# Patient Record
Sex: MALE | Race: WHITE | NOT HISPANIC OR LATINO | URBAN - METROPOLITAN AREA
[De-identification: names, ages, dates, MRNs, and addresses within clinical notes are randomized per-mention and may not be internally consistent; named-entity substitution may affect disease eponyms.]

---

## 2020-06-25 ENCOUNTER — APPOINTMENT (RX ONLY)
Dept: URBAN - METROPOLITAN AREA CLINIC 52 | Facility: CLINIC | Age: 80
Setting detail: DERMATOLOGY
End: 2020-06-25

## 2020-06-25 ENCOUNTER — APPOINTMENT (RX ONLY)
Dept: URBAN - METROPOLITAN AREA CLINIC 51 | Facility: CLINIC | Age: 80
Setting detail: DERMATOLOGY
End: 2020-06-25

## 2020-06-25 DIAGNOSIS — L29.8 OTHER PRURITUS: ICD-10-CM

## 2020-06-25 DIAGNOSIS — L29.89 OTHER PRURITUS: ICD-10-CM

## 2020-06-25 DIAGNOSIS — B02.9 ZOSTER WITHOUT COMPLICATIONS: ICD-10-CM

## 2020-06-25 PROCEDURE — 99202 OFFICE O/P NEW SF 15 MIN: CPT | Mod: 25

## 2020-06-25 PROCEDURE — ? INJECTION

## 2020-06-25 PROCEDURE — ? COUNSELING

## 2020-06-25 PROCEDURE — ? PRESCRIPTION

## 2020-06-25 PROCEDURE — 96372 THER/PROPH/DIAG INJ SC/IM: CPT

## 2020-06-25 RX ORDER — VALACYCLOVIR HYDROCHLORIDE 1 G/1
TABLET, FILM COATED ORAL TID
Qty: 21 | Refills: 0 | Status: ERX | COMMUNITY
Start: 2020-06-25

## 2020-06-25 RX ORDER — TRIAMCINOLONE ACETONIDE 1 MG/G
CREAM TOPICAL BID
Qty: 1 | Refills: 1 | Status: ERX | COMMUNITY
Start: 2020-06-25

## 2020-06-25 RX ORDER — PREDNISONE 5 MG/1
TABLET ORAL
Qty: 60 | Refills: 0 | Status: ERX | COMMUNITY
Start: 2020-06-25

## 2020-06-25 RX ADMIN — VALACYCLOVIR HYDROCHLORIDE: 1 TABLET, FILM COATED ORAL at 00:00

## 2020-06-25 RX ADMIN — TRIAMCINOLONE ACETONIDE: 1 CREAM TOPICAL at 00:00

## 2020-06-25 RX ADMIN — PREDNISONE: 5 TABLET ORAL at 00:00

## 2020-06-25 ASSESSMENT — PAIN INTENSITY VAS
HOW INTENSE IS YOUR PAIN 0 BEING NO PAIN, 10 BEING THE MOST SEVERE PAIN POSSIBLE?: 2/10 PAIN
HOW INTENSE IS YOUR PAIN 0 BEING NO PAIN, 10 BEING THE MOST SEVERE PAIN POSSIBLE?: 2/10 PAIN

## 2020-06-25 ASSESSMENT — LOCATION DETAILED DESCRIPTION DERM
LOCATION DETAILED: LEFT INFERIOR LATERAL UPPER BACK
LOCATION DETAILED: LEFT LATERAL INFERIOR CHEST
LOCATION DETAILED: LEFT LATERAL INFERIOR CHEST
LOCATION DETAILED: RIGHT ANTERIOR SHOULDER
LOCATION DETAILED: LEFT INFERIOR LATERAL UPPER BACK
LOCATION DETAILED: RIGHT ANTERIOR SHOULDER

## 2020-06-25 ASSESSMENT — LOCATION ZONE DERM
LOCATION ZONE: TRUNK
LOCATION ZONE: ARM
LOCATION ZONE: TRUNK
LOCATION ZONE: ARM

## 2020-06-25 ASSESSMENT — LOCATION SIMPLE DESCRIPTION DERM
LOCATION SIMPLE: LEFT UPPER BACK
LOCATION SIMPLE: CHEST
LOCATION SIMPLE: CHEST
LOCATION SIMPLE: RIGHT SHOULDER
LOCATION SIMPLE: LEFT UPPER BACK
LOCATION SIMPLE: RIGHT SHOULDER

## 2020-06-25 NOTE — PROCEDURE: INJECTION
Procedure Information: Please note that the numeric value listed in the Medication (1) and associated J-code units and Medication (2) and associated J-code units variables are j-code amounts and do not represent either the concentration or the total amount of the medications injected.  I strongly recommend selecting no to the Render J-code information in note question. This will allow your note to be more clear. If you are billing j-codes with your injection codes you need to document the total amount of the medication injected. This amount should match the j-code units. For example, if you are injecting Triamcinolone 40mg as an intramuscular injection you would select 40 for the dose field and mg for the units. This would allow you to document  with 4 units (40mg = 10mg x 4). The total volume is not used to calculate j-codes only the amount of the medication administered.
Dose Administered (Numbers Only - Mg, G, Mcg, Units, Cc): 0
Units: cc
Hide Second Medication?: No
Detail Level: None
Dose Administered (Numbers Only - Mg, G, Mcg, Units, Cc): 1
Consent: The risks of the medication was reviewed with the patient.
Render J-Code Information In Note?: yes
Route: IM
Medication (1) And Associated J-Code Units: Depomedrol, 40mg
Post-Care Instructions: I reviewed with the patient in detail post-care instructions. Patient understands to keep the injection sites clean and call the clinic if there is any redness, swelling or pain.

## 2020-06-25 NOTE — PROCEDURE: COUNSELING
Patient Specific Counseling (Will Not Stick From Patient To Patient): Recommended patient to follow up with cancer doctor. Had colon ca in the past.
Detail Level: Detailed
Detail Level: Generalized

## 2020-06-25 NOTE — PROCEDURE: INJECTION
Procedure Information: Please note that the numeric value listed in the Medication (1) and associated J-code units and Medication (2) and associated J-code units variables are j-code amounts and do not represent either the concentration or the total amount of the medications injected.  I strongly recommend selecting no to the Render J-code information in note question. This will allow your note to be more clear. If you are billing j-codes with your injection codes you need to document the total amount of the medication injected. This amount should match the j-code units. For example, if you are injecting Triamcinolone 40mg as an intramuscular injection you would select 40 for the dose field and mg for the units. This would allow you to document  with 4 units (40mg = 10mg x 4). The total volume is not used to calculate j-codes only the amount of the medication administered.
Route: IM
Medication (1) And Associated J-Code Units: Depomedrol, 40mg
Dose Administered (Numbers Only - Mg, G, Mcg, Units, Cc): 0
Units: cc
Post-Care Instructions: I reviewed with the patient in detail post-care instructions. Patient understands to keep the injection sites clean and call the clinic if there is any redness, swelling or pain.
Hide Second Medication?: No
Total Volume Injected In Cc (Will Not Affected Billing): 1
Bill J-Code: yes
Detail Level: None
Consent: The risks of the medication was reviewed with the patient.

## 2020-06-25 NOTE — PROCEDURE: COUNSELING
Detail Level: Detailed
Detail Level: Generalized
Patient Specific Counseling (Will Not Stick From Patient To Patient): Recommended patient to follow up with cancer doctor. Had colon ca in the past.

## 2020-06-30 ENCOUNTER — APPOINTMENT (RX ONLY)
Dept: URBAN - METROPOLITAN AREA CLINIC 51 | Facility: CLINIC | Age: 80
Setting detail: DERMATOLOGY
End: 2020-06-30

## 2020-06-30 ENCOUNTER — APPOINTMENT (RX ONLY)
Dept: URBAN - METROPOLITAN AREA CLINIC 52 | Facility: CLINIC | Age: 80
Setting detail: DERMATOLOGY
End: 2020-06-30

## 2020-06-30 DIAGNOSIS — B02.9 ZOSTER WITHOUT COMPLICATIONS: ICD-10-CM | Status: IMPROVED

## 2020-06-30 PROCEDURE — ? COUNSELING

## 2020-06-30 PROCEDURE — 99213 OFFICE O/P EST LOW 20 MIN: CPT

## 2020-06-30 PROCEDURE — ? PRESCRIPTION MEDICATION MANAGEMENT

## 2020-06-30 PROCEDURE — ? PRESCRIPTION

## 2020-06-30 RX ORDER — ZOSTER VACCINE LIVE 19400 [PFU]/.65ML
INJECTION, POWDER, LYOPHILIZED, FOR SUSPENSION SUBCUTANEOUS
Qty: 1 | Refills: 0 | COMMUNITY
Start: 2020-06-30

## 2020-06-30 RX ADMIN — ZOSTER VACCINE LIVE: 19400 INJECTION, POWDER, LYOPHILIZED, FOR SUSPENSION SUBCUTANEOUS at 00:00

## 2020-06-30 ASSESSMENT — LOCATION SIMPLE DESCRIPTION DERM
LOCATION SIMPLE: LEFT UPPER BACK
LOCATION SIMPLE: CHEST
LOCATION SIMPLE: LEFT UPPER BACK
LOCATION SIMPLE: CHEST

## 2020-06-30 ASSESSMENT — LOCATION ZONE DERM
LOCATION ZONE: TRUNK
LOCATION ZONE: TRUNK

## 2020-06-30 ASSESSMENT — LOCATION DETAILED DESCRIPTION DERM
LOCATION DETAILED: LEFT INFERIOR LATERAL UPPER BACK
LOCATION DETAILED: LEFT LATERAL INFERIOR CHEST
LOCATION DETAILED: LEFT INFERIOR LATERAL UPPER BACK
LOCATION DETAILED: LEFT LATERAL INFERIOR CHEST

## 2020-06-30 NOTE — PROCEDURE: COUNSELING
Detail Level: Detailed
Patient Specific Counseling (Will Not Stick From Patient To Patient): Recommended waiting 1 month before having vaccine

## 2020-06-30 NOTE — PROCEDURE: PRESCRIPTION MEDICATION MANAGEMENT
Continue Regimen: Prednisone, valtrex until gone. Continue triamcinolone
Detail Level: Zone
Render In Strict Bullet Format?: No

## 2020-06-30 NOTE — PROCEDURE: COUNSELING
Patient Specific Counseling (Will Not Stick From Patient To Patient): Recommended waiting 1 month before having vaccine
Detail Level: Detailed

## 2020-06-30 NOTE — PROCEDURE: PRESCRIPTION MEDICATION MANAGEMENT
Render In Strict Bullet Format?: No
Continue Regimen: Prednisone, valtrex until gone. Continue triamcinolone
Detail Level: Zone

## 2020-07-14 ENCOUNTER — APPOINTMENT (RX ONLY)
Dept: URBAN - METROPOLITAN AREA CLINIC 52 | Facility: CLINIC | Age: 80
Setting detail: DERMATOLOGY
End: 2020-07-14

## 2020-07-14 ENCOUNTER — APPOINTMENT (RX ONLY)
Dept: URBAN - METROPOLITAN AREA CLINIC 51 | Facility: CLINIC | Age: 80
Setting detail: DERMATOLOGY
End: 2020-07-14

## 2020-07-14 DIAGNOSIS — B02.9 ZOSTER WITHOUT COMPLICATIONS: ICD-10-CM | Status: RESOLVING

## 2020-07-14 PROCEDURE — ? PRESCRIPTION MEDICATION MANAGEMENT

## 2020-07-14 PROCEDURE — ? COUNSELING

## 2020-07-14 PROCEDURE — 99213 OFFICE O/P EST LOW 20 MIN: CPT

## 2020-07-14 ASSESSMENT — LOCATION ZONE DERM
LOCATION ZONE: TRUNK
LOCATION ZONE: TRUNK

## 2020-07-14 ASSESSMENT — LOCATION SIMPLE DESCRIPTION DERM
LOCATION SIMPLE: CHEST
LOCATION SIMPLE: CHEST
LOCATION SIMPLE: LEFT UPPER BACK
LOCATION SIMPLE: LEFT UPPER BACK

## 2021-04-20 ENCOUNTER — OFFICE VISIT (OUTPATIENT)
Dept: URBAN - METROPOLITAN AREA CLINIC 118 | Facility: CLINIC | Age: 81
End: 2021-04-20
Payer: MEDICARE

## 2021-04-20 ENCOUNTER — DASHBOARD ENCOUNTERS (OUTPATIENT)
Age: 81
End: 2021-04-20

## 2021-04-20 DIAGNOSIS — Z12.11 COLON CANCER SCREENING: ICD-10-CM

## 2021-04-20 PROCEDURE — 993 AGA: Performed by: INTERNAL MEDICINE

## 2021-04-20 RX ORDER — ATORVASTATIN CALCIUM, FILM COATED 20 MG/1
TABLET ORAL
Qty: 90 DELAYED RELEASE TABLET | Status: ACTIVE | COMMUNITY

## 2021-04-20 RX ORDER — ASPIRIN 81 MG/1
TABLET, COATED ORAL
Qty: 0 | Refills: 0 | Status: DISCONTINUED | COMMUNITY
Start: 1900-01-01

## 2021-04-20 RX ORDER — HYDROCHLOROTHIAZIDE 25 MG/1
TABLET ORAL
Qty: 90 DELAYED RELEASE TABLET | Status: ACTIVE | COMMUNITY

## 2021-04-20 RX ORDER — ATENOLOL 25 MG/1
1 TABLET TABLET ORAL ONCE A DAY
Refills: 0 | Status: ACTIVE | COMMUNITY
Start: 2017-05-15

## 2021-04-20 NOTE — HPI-TODAY'S VISIT:
The patient was last seen in 2018 for a routine colonoscopy.  He returns to schedule a surveillance.  He has a history of both of colon cancer in 2006, status post resection, as well as a large cecal serrated adenoma in 2017.  A surveillance colonoscopy in 2018 showed no evidence of residual adenomatous tissue.  He denies any recent rectal bleeding, abdominal pain, acute change in his bowels, or abnormal loss of weight.  He has no family history of colon cancer or polyps.  He has no active cardiopulmonary disease other than hypertension.

## 2021-05-28 ENCOUNTER — CLAIMS CREATED FROM THE CLAIM WINDOW (OUTPATIENT)
Dept: URBAN - METROPOLITAN AREA CLINIC 4 | Facility: CLINIC | Age: 81
End: 2021-05-28
Payer: MEDICARE

## 2021-05-28 ENCOUNTER — OFFICE VISIT (OUTPATIENT)
Dept: URBAN - METROPOLITAN AREA SURGERY CENTER 23 | Facility: SURGERY CENTER | Age: 81
End: 2021-05-28
Payer: MEDICARE

## 2021-05-28 DIAGNOSIS — D12.2 ADENOMA OF ASCENDING COLON: ICD-10-CM

## 2021-05-28 DIAGNOSIS — K63.89 JEJUNAL POLYP: ICD-10-CM

## 2021-05-28 DIAGNOSIS — D12.2 BENIGN NEOPLASM OF ASCENDING COLON: ICD-10-CM

## 2021-05-28 DIAGNOSIS — Z85.038 H/O COLON CANCER, STAGE I: ICD-10-CM

## 2021-05-28 PROCEDURE — 45385 COLONOSCOPY W/LESION REMOVAL: CPT | Performed by: INTERNAL MEDICINE

## 2021-05-28 PROCEDURE — G8907 PT DOC NO EVENTS ON DISCHARG: HCPCS | Performed by: INTERNAL MEDICINE

## 2021-05-28 PROCEDURE — 88305 TISSUE EXAM BY PATHOLOGIST: CPT | Performed by: PATHOLOGY

## 2021-05-28 RX ORDER — ATENOLOL 25 MG/1
1 TABLET TABLET ORAL ONCE A DAY
Refills: 0 | Status: ACTIVE | COMMUNITY
Start: 2017-05-15

## 2021-05-28 RX ORDER — HYDROCHLOROTHIAZIDE 25 MG/1
TABLET ORAL
Qty: 90 DELAYED RELEASE TABLET | Status: ACTIVE | COMMUNITY

## 2021-05-28 RX ORDER — ATORVASTATIN CALCIUM, FILM COATED 20 MG/1
TABLET ORAL
Qty: 90 DELAYED RELEASE TABLET | Status: ACTIVE | COMMUNITY

## 2024-08-21 ENCOUNTER — OFFICE VISIT (OUTPATIENT)
Dept: URBAN - METROPOLITAN AREA CLINIC 118 | Facility: CLINIC | Age: 84
End: 2024-08-21
Payer: MEDICARE

## 2024-08-21 ENCOUNTER — LAB OUTSIDE AN ENCOUNTER (OUTPATIENT)
Dept: URBAN - METROPOLITAN AREA CLINIC 118 | Facility: CLINIC | Age: 84
End: 2024-08-21

## 2024-08-21 VITALS
HEIGHT: 68 IN | TEMPERATURE: 97.5 F | WEIGHT: 206.2 LBS | DIASTOLIC BLOOD PRESSURE: 85 MMHG | BODY MASS INDEX: 31.25 KG/M2 | HEART RATE: 86 BPM | SYSTOLIC BLOOD PRESSURE: 190 MMHG

## 2024-08-21 DIAGNOSIS — R54 ADVANCED AGE: ICD-10-CM

## 2024-08-21 DIAGNOSIS — Z85.038 PERSONAL HISTORY OF COLON CANCER: ICD-10-CM

## 2024-08-21 PROCEDURE — 99202 OFFICE O/P NEW SF 15 MIN: CPT | Performed by: INTERNAL MEDICINE

## 2024-08-21 RX ORDER — HYDROCHLOROTHIAZIDE 25 MG/1
TABLET ORAL
Qty: 90 DELAYED RELEASE TABLET | Status: ACTIVE | COMMUNITY

## 2024-08-21 RX ORDER — ATENOLOL 25 MG/1
1 TABLET TABLET ORAL ONCE A DAY
Refills: 0 | Status: ACTIVE | COMMUNITY
Start: 2017-05-15

## 2024-08-21 RX ORDER — LOSARTAN POTASSIUM 50 MG/1
TABLET, FILM COATED ORAL
Qty: 90 DELAYED RELEASE TABLET | Status: ACTIVE | COMMUNITY

## 2024-08-21 RX ORDER — ATORVASTATIN CALCIUM, FILM COATED 20 MG/1
TABLET ORAL
Qty: 90 DELAYED RELEASE TABLET | Status: ACTIVE | COMMUNITY

## 2024-08-21 NOTE — HPI-TODAY'S VISIT:
The patient was last seen in 2021 for a surveillance colonoscopy and tubular adenoma was removed.  He is due for a surveillance colonoscopy.  Since his last procedure, he has had no hospitalizations, surgeries, cardiac events, or pulmonary disease.  He has been diagnosed with mild peripheral artery disease with a 60% stenosis in the right carotid but the left carotid is normal.  He has no nausea, vomiting, significant abdominal pain.  He has had some mild senescent oropharyngeal dysphagia with occasional coughing with liquids.  There is been no aspiration pneumonia, esophageal dysphagia, he hematemesis, or odynophagia.

## 2024-08-22 PROBLEM — 49808004: Status: ACTIVE | Noted: 2024-08-22

## 2024-09-24 ENCOUNTER — OFFICE VISIT (OUTPATIENT)
Dept: URBAN - METROPOLITAN AREA SURGERY CENTER 23 | Facility: SURGERY CENTER | Age: 84
End: 2024-09-24